# Patient Record
Sex: FEMALE | Race: BLACK OR AFRICAN AMERICAN | NOT HISPANIC OR LATINO
[De-identification: names, ages, dates, MRNs, and addresses within clinical notes are randomized per-mention and may not be internally consistent; named-entity substitution may affect disease eponyms.]

---

## 2017-05-03 ENCOUNTER — APPOINTMENT (OUTPATIENT)
Dept: CT IMAGING | Facility: CLINIC | Age: 47
End: 2017-05-03

## 2017-05-03 ENCOUNTER — OUTPATIENT (OUTPATIENT)
Dept: OUTPATIENT SERVICES | Facility: HOSPITAL | Age: 47
LOS: 1 days | End: 2017-05-03
Payer: MEDICARE

## 2017-05-03 DIAGNOSIS — Z00.8 ENCOUNTER FOR OTHER GENERAL EXAMINATION: ICD-10-CM

## 2017-05-03 PROCEDURE — 74177 CT ABD & PELVIS W/CONTRAST: CPT

## 2017-08-30 ENCOUNTER — APPOINTMENT (OUTPATIENT)
Dept: CT IMAGING | Facility: CLINIC | Age: 47
End: 2017-08-30

## 2017-09-10 ENCOUNTER — APPOINTMENT (OUTPATIENT)
Dept: MRI IMAGING | Facility: CLINIC | Age: 47
End: 2017-09-10

## 2017-10-11 ENCOUNTER — OUTPATIENT (OUTPATIENT)
Dept: OUTPATIENT SERVICES | Facility: HOSPITAL | Age: 47
LOS: 1 days | Discharge: HOME | End: 2017-10-11
Payer: OTHER MISCELLANEOUS

## 2017-10-11 VITALS
RESPIRATION RATE: 18 BRPM | HEART RATE: 73 BPM | HEIGHT: 71 IN | WEIGHT: 190.92 LBS | DIASTOLIC BLOOD PRESSURE: 104 MMHG | SYSTOLIC BLOOD PRESSURE: 140 MMHG | OXYGEN SATURATION: 100 % | TEMPERATURE: 98 F

## 2017-10-11 DIAGNOSIS — Z01.818 ENCOUNTER FOR OTHER PREPROCEDURAL EXAMINATION: ICD-10-CM

## 2017-10-11 DIAGNOSIS — M54.12 RADICULOPATHY, CERVICAL REGION: ICD-10-CM

## 2017-10-11 DIAGNOSIS — Z98.891 HISTORY OF UTERINE SCAR FROM PREVIOUS SURGERY: Chronic | ICD-10-CM

## 2017-10-11 DIAGNOSIS — Z90.49 ACQUIRED ABSENCE OF OTHER SPECIFIED PARTS OF DIGESTIVE TRACT: Chronic | ICD-10-CM

## 2017-10-11 LAB
ANION GAP SERPL CALC-SCNC: 8 MMOL/L — SIGNIFICANT CHANGE UP (ref 5–17)
APPEARANCE UR: CLEAR — SIGNIFICANT CHANGE UP
APTT BLD: 27.4 SEC — LOW (ref 27.5–37.4)
BASOPHILS # BLD AUTO: 0.1 K/UL — SIGNIFICANT CHANGE UP (ref 0–0.2)
BILIRUB UR-MCNC: NEGATIVE — SIGNIFICANT CHANGE UP
BUN SERPL-MCNC: 10 MG/DL — SIGNIFICANT CHANGE UP (ref 7–23)
CALCIUM SERPL-MCNC: 8.4 MG/DL — LOW (ref 8.5–10.1)
CHLORIDE SERPL-SCNC: 107 MMOL/L — SIGNIFICANT CHANGE UP (ref 96–108)
CO2 SERPL-SCNC: 24 MMOL/L — SIGNIFICANT CHANGE UP (ref 22–31)
COLOR SPEC: YELLOW — SIGNIFICANT CHANGE UP
CREAT SERPL-MCNC: 0.93 MG/DL — SIGNIFICANT CHANGE UP (ref 0.5–1.3)
DIFF PNL FLD: NEGATIVE — SIGNIFICANT CHANGE UP
EOSINOPHIL # BLD AUTO: 0.2 K/UL — SIGNIFICANT CHANGE UP (ref 0–0.5)
EOSINOPHIL NFR BLD AUTO: 1 % — SIGNIFICANT CHANGE UP (ref 0–6)
GLUCOSE SERPL-MCNC: 90 MG/DL — SIGNIFICANT CHANGE UP (ref 70–99)
GLUCOSE UR QL: NEGATIVE MG/DL — SIGNIFICANT CHANGE UP
HBA1C BLD-MCNC: 5.8 % — HIGH (ref 4–5.6)
HCT VFR BLD CALC: 32.2 % — LOW (ref 34.5–45)
HGB BLD-MCNC: 10 G/DL — LOW (ref 11.5–15.5)
INR BLD: 0.93 RATIO — SIGNIFICANT CHANGE UP (ref 0.88–1.16)
KETONES UR-MCNC: NEGATIVE — SIGNIFICANT CHANGE UP
LEUKOCYTE ESTERASE UR-ACNC: NEGATIVE — SIGNIFICANT CHANGE UP
LYMPHOCYTES # BLD AUTO: 1.8 K/UL — SIGNIFICANT CHANGE UP (ref 1–3.3)
LYMPHOCYTES # BLD AUTO: 12 % — LOW (ref 13–44)
MCHC RBC-ENTMCNC: 26 PG — LOW (ref 27–34)
MCHC RBC-ENTMCNC: 31.1 GM/DL — LOW (ref 32–36)
MCV RBC AUTO: 83.5 FL — SIGNIFICANT CHANGE UP (ref 80–100)
MONOCYTES # BLD AUTO: 0.5 K/UL — SIGNIFICANT CHANGE UP (ref 0–0.9)
MONOCYTES NFR BLD AUTO: 6 % — SIGNIFICANT CHANGE UP (ref 2–14)
NEUTROPHILS # BLD AUTO: 3 K/UL — SIGNIFICANT CHANGE UP (ref 1.8–7.4)
NEUTROPHILS NFR BLD AUTO: 81 % — HIGH (ref 43–77)
NITRITE UR-MCNC: POSITIVE
PH UR: 5 — SIGNIFICANT CHANGE UP (ref 5–8)
PLATELET # BLD AUTO: 309 K/UL — SIGNIFICANT CHANGE UP (ref 150–400)
POTASSIUM SERPL-MCNC: 4 MMOL/L — SIGNIFICANT CHANGE UP (ref 3.5–5.3)
POTASSIUM SERPL-SCNC: 4 MMOL/L — SIGNIFICANT CHANGE UP (ref 3.5–5.3)
PROT UR-MCNC: NEGATIVE MG/DL — SIGNIFICANT CHANGE UP
PROTHROM AB SERPL-ACNC: 10.1 SEC — SIGNIFICANT CHANGE UP (ref 9.8–12.7)
RBC # BLD: 3.85 M/UL — SIGNIFICANT CHANGE UP (ref 3.8–5.2)
RBC # FLD: 15.7 % — HIGH (ref 11–15)
SODIUM SERPL-SCNC: 139 MMOL/L — SIGNIFICANT CHANGE UP (ref 135–145)
SP GR SPEC: 1.01 — SIGNIFICANT CHANGE UP (ref 1.01–1.02)
UROBILINOGEN FLD QL: NEGATIVE MG/DL — SIGNIFICANT CHANGE UP
WBC # BLD: 5.6 K/UL — SIGNIFICANT CHANGE UP (ref 3.8–10.5)
WBC # FLD AUTO: 5.6 K/UL — SIGNIFICANT CHANGE UP (ref 3.8–10.5)

## 2017-10-11 PROCEDURE — 93010 ELECTROCARDIOGRAM REPORT: CPT | Mod: NC

## 2017-10-11 NOTE — H&P PST ADULT - HISTORY OF PRESENT ILLNESS
48 yo female c/o neck pains due to work injury - not responding to conservative management- radiculopathy- scheduled for cervical diskectomy

## 2017-10-11 NOTE — H&P PST ADULT - PSH
C2 cervical fracture    S/P  section  With Tubal Ligation ( 17 years ago )  S/P laparoscopic cholecystectomy  2017

## 2017-10-11 NOTE — H&P PST ADULT - NSANTHOSAYNRD_GEN_A_CORE
No. BRI screening performed.  STOP BANG Legend: 0-2 = LOW Risk; 3-4 = INTERMEDIATE Risk; 5-8 = HIGH Risk

## 2017-10-12 DIAGNOSIS — M54.2 CERVICALGIA: ICD-10-CM

## 2017-10-12 DIAGNOSIS — I10 ESSENTIAL (PRIMARY) HYPERTENSION: ICD-10-CM

## 2017-10-23 ENCOUNTER — INPATIENT (INPATIENT)
Facility: HOSPITAL | Age: 47
LOS: 1 days | Discharge: HOME | End: 2017-10-25
Attending: ORTHOPAEDIC SURGERY | Admitting: ORTHOPAEDIC SURGERY
Payer: OTHER MISCELLANEOUS

## 2017-10-23 ENCOUNTER — RESULT REVIEW (OUTPATIENT)
Age: 47
End: 2017-10-23

## 2017-10-23 VITALS
HEIGHT: 71 IN | WEIGHT: 192.02 LBS | SYSTOLIC BLOOD PRESSURE: 131 MMHG | RESPIRATION RATE: 18 BRPM | TEMPERATURE: 98 F | DIASTOLIC BLOOD PRESSURE: 95 MMHG | OXYGEN SATURATION: 99 % | HEART RATE: 68 BPM

## 2017-10-23 DIAGNOSIS — I10 ESSENTIAL (PRIMARY) HYPERTENSION: ICD-10-CM

## 2017-10-23 DIAGNOSIS — Z29.9 ENCOUNTER FOR PROPHYLACTIC MEASURES, UNSPECIFIED: ICD-10-CM

## 2017-10-23 DIAGNOSIS — Z90.49 ACQUIRED ABSENCE OF OTHER SPECIFIED PARTS OF DIGESTIVE TRACT: Chronic | ICD-10-CM

## 2017-10-23 DIAGNOSIS — Z98.891 HISTORY OF UTERINE SCAR FROM PREVIOUS SURGERY: Chronic | ICD-10-CM

## 2017-10-23 DIAGNOSIS — R51 HEADACHE: ICD-10-CM

## 2017-10-23 PROCEDURE — 88304 TISSUE EXAM BY PATHOLOGIST: CPT | Mod: 26

## 2017-10-23 PROCEDURE — 88311 DECALCIFY TISSUE: CPT | Mod: 26

## 2017-10-23 RX ORDER — PREGABALIN 225 MG/1
1 CAPSULE ORAL
Qty: 0 | Refills: 0 | COMMUNITY

## 2017-10-23 RX ORDER — FENTANYL CITRATE 50 UG/ML
50 INJECTION INTRAVENOUS
Qty: 0 | Refills: 0 | Status: DISCONTINUED | OUTPATIENT
Start: 2017-10-23 | End: 2017-10-23

## 2017-10-23 RX ORDER — KETOROLAC TROMETHAMINE 30 MG/ML
30 SYRINGE (ML) INJECTION ONCE
Qty: 0 | Refills: 0 | Status: DISCONTINUED | OUTPATIENT
Start: 2017-10-23 | End: 2017-10-23

## 2017-10-23 RX ORDER — FAMOTIDINE 10 MG/ML
20 INJECTION INTRAVENOUS EVERY 12 HOURS
Qty: 0 | Refills: 0 | Status: DISCONTINUED | OUTPATIENT
Start: 2017-10-23 | End: 2017-10-25

## 2017-10-23 RX ORDER — FENTANYL CITRATE 50 UG/ML
25 INJECTION INTRAVENOUS
Qty: 0 | Refills: 0 | Status: DISCONTINUED | OUTPATIENT
Start: 2017-10-23 | End: 2017-10-23

## 2017-10-23 RX ORDER — CYCLOBENZAPRINE HYDROCHLORIDE 10 MG/1
10 TABLET, FILM COATED ORAL THREE TIMES A DAY
Qty: 0 | Refills: 0 | Status: DISCONTINUED | OUTPATIENT
Start: 2017-10-23 | End: 2017-10-25

## 2017-10-23 RX ORDER — ZOLPIDEM TARTRATE 10 MG/1
5 TABLET ORAL ONCE
Qty: 0 | Refills: 0 | Status: DISCONTINUED | OUTPATIENT
Start: 2017-10-23 | End: 2017-10-23

## 2017-10-23 RX ORDER — MORPHINE SULFATE 50 MG/1
2 CAPSULE, EXTENDED RELEASE ORAL
Qty: 0 | Refills: 0 | Status: DISCONTINUED | OUTPATIENT
Start: 2017-10-23 | End: 2017-10-23

## 2017-10-23 RX ORDER — SODIUM CHLORIDE 9 MG/ML
1000 INJECTION, SOLUTION INTRAVENOUS
Qty: 0 | Refills: 0 | Status: DISCONTINUED | OUTPATIENT
Start: 2017-10-23 | End: 2017-10-23

## 2017-10-23 RX ORDER — NORTRIPTYLINE HYDROCHLORIDE 10 MG/1
25 CAPSULE ORAL AT BEDTIME
Qty: 0 | Refills: 0 | Status: DISCONTINUED | OUTPATIENT
Start: 2017-10-23 | End: 2017-10-25

## 2017-10-23 RX ORDER — DIPHENHYDRAMINE HCL 50 MG
12.5 CAPSULE ORAL EVERY 4 HOURS
Qty: 0 | Refills: 0 | Status: DISCONTINUED | OUTPATIENT
Start: 2017-10-23 | End: 2017-10-25

## 2017-10-23 RX ORDER — ACETAMINOPHEN 500 MG
650 TABLET ORAL EVERY 6 HOURS
Qty: 0 | Refills: 0 | Status: DISCONTINUED | OUTPATIENT
Start: 2017-10-23 | End: 2017-10-25

## 2017-10-23 RX ORDER — ATENOLOL 25 MG/1
25 TABLET ORAL DAILY
Qty: 0 | Refills: 0 | Status: DISCONTINUED | OUTPATIENT
Start: 2017-10-23 | End: 2017-10-25

## 2017-10-23 RX ORDER — ACETAMINOPHEN 500 MG
1000 TABLET ORAL ONCE
Qty: 0 | Refills: 0 | Status: COMPLETED | OUTPATIENT
Start: 2017-10-23 | End: 2017-10-23

## 2017-10-23 RX ORDER — CARISOPRODOL 250 MG
1 TABLET ORAL
Qty: 0 | Refills: 0 | COMMUNITY

## 2017-10-23 RX ORDER — OXYCODONE AND ACETAMINOPHEN 5; 325 MG/1; MG/1
1 TABLET ORAL EVERY 4 HOURS
Qty: 0 | Refills: 0 | Status: DISCONTINUED | OUTPATIENT
Start: 2017-10-23 | End: 2017-10-23

## 2017-10-23 RX ORDER — SODIUM CHLORIDE 9 MG/ML
3 INJECTION INTRAMUSCULAR; INTRAVENOUS; SUBCUTANEOUS EVERY 8 HOURS
Qty: 0 | Refills: 0 | Status: DISCONTINUED | OUTPATIENT
Start: 2017-10-23 | End: 2017-10-23

## 2017-10-23 RX ORDER — ATENOLOL 25 MG/1
1 TABLET ORAL
Qty: 0 | Refills: 0 | COMMUNITY

## 2017-10-23 RX ORDER — ZOLPIDEM TARTRATE 10 MG/1
1 TABLET ORAL
Qty: 0 | Refills: 0 | COMMUNITY

## 2017-10-23 RX ORDER — CEFAZOLIN SODIUM 1 G
2000 VIAL (EA) INJECTION EVERY 8 HOURS
Qty: 0 | Refills: 0 | Status: COMPLETED | OUTPATIENT
Start: 2017-10-23 | End: 2017-10-24

## 2017-10-23 RX ORDER — OXYCODONE AND ACETAMINOPHEN 5; 325 MG/1; MG/1
2 TABLET ORAL EVERY 6 HOURS
Qty: 0 | Refills: 0 | Status: DISCONTINUED | OUTPATIENT
Start: 2017-10-23 | End: 2017-10-25

## 2017-10-23 RX ORDER — SODIUM CHLORIDE 9 MG/ML
1000 INJECTION INTRAMUSCULAR; INTRAVENOUS; SUBCUTANEOUS
Qty: 0 | Refills: 0 | Status: DISCONTINUED | OUTPATIENT
Start: 2017-10-23 | End: 2017-10-24

## 2017-10-23 RX ORDER — NORTRIPTYLINE HYDROCHLORIDE 10 MG/1
0 CAPSULE ORAL
Qty: 0 | Refills: 0 | COMMUNITY

## 2017-10-23 RX ORDER — ONDANSETRON 8 MG/1
4 TABLET, FILM COATED ORAL EVERY 6 HOURS
Qty: 0 | Refills: 0 | Status: DISCONTINUED | OUTPATIENT
Start: 2017-10-23 | End: 2017-10-25

## 2017-10-23 RX ADMIN — Medication 400 MILLIGRAM(S): at 10:22

## 2017-10-23 RX ADMIN — FENTANYL CITRATE 50 MICROGRAM(S): 50 INJECTION INTRAVENOUS at 10:45

## 2017-10-23 RX ADMIN — OXYCODONE AND ACETAMINOPHEN 2 TABLET(S): 5; 325 TABLET ORAL at 20:54

## 2017-10-23 RX ADMIN — Medication 100 MILLIGRAM(S): at 18:04

## 2017-10-23 RX ADMIN — ZOLPIDEM TARTRATE 5 MILLIGRAM(S): 10 TABLET ORAL at 21:22

## 2017-10-23 RX ADMIN — MORPHINE SULFATE 2 MILLIGRAM(S): 50 CAPSULE, EXTENDED RELEASE ORAL at 16:33

## 2017-10-23 RX ADMIN — OXYCODONE AND ACETAMINOPHEN 2 TABLET(S): 5; 325 TABLET ORAL at 14:50

## 2017-10-23 RX ADMIN — Medication 100 MILLIGRAM(S): at 15:38

## 2017-10-23 RX ADMIN — NORTRIPTYLINE HYDROCHLORIDE 25 MILLIGRAM(S): 10 CAPSULE ORAL at 21:13

## 2017-10-23 RX ADMIN — SODIUM CHLORIDE 75 MILLILITER(S): 9 INJECTION, SOLUTION INTRAVENOUS at 10:22

## 2017-10-23 RX ADMIN — OXYCODONE AND ACETAMINOPHEN 2 TABLET(S): 5; 325 TABLET ORAL at 19:54

## 2017-10-23 RX ADMIN — MORPHINE SULFATE 2 MILLIGRAM(S): 50 CAPSULE, EXTENDED RELEASE ORAL at 12:18

## 2017-10-23 RX ADMIN — MORPHINE SULFATE 2 MILLIGRAM(S): 50 CAPSULE, EXTENDED RELEASE ORAL at 16:50

## 2017-10-23 RX ADMIN — FENTANYL CITRATE 50 MICROGRAM(S): 50 INJECTION INTRAVENOUS at 10:22

## 2017-10-23 RX ADMIN — SODIUM CHLORIDE 75 MILLILITER(S): 9 INJECTION INTRAMUSCULAR; INTRAVENOUS; SUBCUTANEOUS at 21:13

## 2017-10-23 RX ADMIN — OXYCODONE AND ACETAMINOPHEN 2 TABLET(S): 5; 325 TABLET ORAL at 13:52

## 2017-10-23 RX ADMIN — MORPHINE SULFATE 2 MILLIGRAM(S): 50 CAPSULE, EXTENDED RELEASE ORAL at 12:40

## 2017-10-23 RX ADMIN — Medication 1000 MILLIGRAM(S): at 10:45

## 2017-10-23 NOTE — CONSULT NOTE ADULT - ASSESSMENT
47 y.o PMH HTN  s/p ACDF POD #0 c/o of right frontal HA and mild blurry vision after surgery. Blurry vision has now resolved and ot denies any dizziness while at rest. Pt has no focal deficits  and is talking and eating well and appears to be in no distress. Sxs possibly d/t side effects of general  anesthesia vs pain medication vs less likely CSF leak l. Would give trial of Tylenol and encourage hydration and reassess. If pain increases and is  refractory to pain control or becomes neurologically symptomatic she should get CT of the head.

## 2017-10-23 NOTE — BRIEF OPERATIVE NOTE - PROCEDURE
<<-----Click on this checkbox to enter Procedure Cervical disc arthroplasty  10/23/2017  C 6/7  Active  EVERT

## 2017-10-23 NOTE — CHART NOTE - NSCHARTNOTEFT_GEN_A_CORE
POD#0 s/p ACDF  Called for patient c/o headache.  Patient seen and examined bedside resting comfortably.  Denies cp, dyspnea, dizziness, palpitations.  Admits to mild odynophagia "because of breathing tube"  Was given percocet about 1.5 hours ago, and reports headache began about 15 minutes ago.  Ate a small lunch, denies n/v.    T(F): 97.7 (10-23-17 @ 15:35), Max: 97.7 (10-23-17 @ 06:42)  HR: 81 (10-23-17 @ 15:35) (68 - 82)  BP: 119/87 (10-23-17 @ 15:35) (107/75 - 131/95)  RR: 17 (10-23-17 @ 15:35) (12 - 18)  SpO2: 97% (10-23-17 @ 15:35) (94% - 100%)      PHYSICAL EXAM:  General: NAD, WDWN  Neuro:  Alert & oriented x 3  HEENT: NCAT, EOMI, conjunctiva clear  Neck: supple, no swelling. Dressing CDI.  CV: +S1+S2 regular rate and rhythm  Lung: clear to ausculation bilaterally, respirations nonlabored, good inspiratory effort  Abdomen: soft, NTND. Normoactive BS  Extremities: no pedal edema or calf tenderness noted   MS: normal upper extremity strength and FROM b/l upper ext. Good pulses      Impression: 47y Female admitted with EXTENSIVE SURGERY/TOTAL CERVICAL DISC REPLACEMENT C6-C7 WITH  TOTAL CERVICAL DISC REPLACEMENT C6-C7 WITH IMAGE now c/o headache    PMH   HTN (hypertension)  Back injuries  Depressed      Plan:  -pt does not want any medication for headache at this time  -notified Dr Tao's PA, Godfrey. Tylenol prn.  -close monitoring, Continue current regimen and medications  -add maintenance IVF for now

## 2017-10-23 NOTE — CONSULT NOTE ADULT - SUBJECTIVE AND OBJECTIVE BOX
47 y.o PMH HTN  s/p ACDF POD 0 c/o of right frontal HA and mild blurry vision after surgery. Pt reports the headache started at after the procedure and described the pain as throbbig and 7/10. Also c/o of the room spinning when trying to get up to go to the bathroom. Denies any syncope  n/v fever or chills or facial weakness or dysarthria. Denies any hx of HA in the past. Pt reports the dizziness and blurry vision have resolved but the HA remains. Pt has been feeding and eliminating  well since surgery.     PMH: HTN, depression   PSH: cholecystectomy, ACDF, c/s  allergies: none  meds: soma  prn, percocet prn, atenolol    VS reviewed and stable   GEN: NAD, no distress speaking in full sentences   HEENT: PERRLA, EOMI.   neuro: no focal deficits. CNI.  strength and sensation intact   resp: CTABL  CV: rrr, no m/r/g  abd: soft

## 2017-10-24 LAB
ANION GAP SERPL CALC-SCNC: 9 MMOL/L — SIGNIFICANT CHANGE UP (ref 5–17)
BUN SERPL-MCNC: 5 MG/DL — LOW (ref 7–23)
CALCIUM SERPL-MCNC: 7.8 MG/DL — LOW (ref 8.5–10.1)
CHLORIDE SERPL-SCNC: 107 MMOL/L — SIGNIFICANT CHANGE UP (ref 96–108)
CO2 SERPL-SCNC: 25 MMOL/L — SIGNIFICANT CHANGE UP (ref 22–31)
CREAT SERPL-MCNC: 0.86 MG/DL — SIGNIFICANT CHANGE UP (ref 0.5–1.3)
GLUCOSE SERPL-MCNC: 97 MG/DL — SIGNIFICANT CHANGE UP (ref 70–99)
HCT VFR BLD CALC: 29.2 % — LOW (ref 34.5–45)
HGB BLD-MCNC: 9.2 G/DL — LOW (ref 11.5–15.5)
MCHC RBC-ENTMCNC: 26.6 PG — LOW (ref 27–34)
MCHC RBC-ENTMCNC: 31.4 GM/DL — LOW (ref 32–36)
MCV RBC AUTO: 84.6 FL — SIGNIFICANT CHANGE UP (ref 80–100)
PLATELET # BLD AUTO: 265 K/UL — SIGNIFICANT CHANGE UP (ref 150–400)
POTASSIUM SERPL-MCNC: 3.4 MMOL/L — LOW (ref 3.5–5.3)
POTASSIUM SERPL-SCNC: 3.4 MMOL/L — LOW (ref 3.5–5.3)
RBC # BLD: 3.45 M/UL — LOW (ref 3.8–5.2)
RBC # FLD: 16 % — HIGH (ref 11–15)
SODIUM SERPL-SCNC: 141 MMOL/L — SIGNIFICANT CHANGE UP (ref 135–145)
WBC # BLD: 8.8 K/UL — SIGNIFICANT CHANGE UP (ref 3.8–10.5)
WBC # FLD AUTO: 8.8 K/UL — SIGNIFICANT CHANGE UP (ref 3.8–10.5)

## 2017-10-24 RX ORDER — METOCLOPRAMIDE HCL 10 MG
10 TABLET ORAL ONCE
Qty: 0 | Refills: 0 | Status: DISCONTINUED | OUTPATIENT
Start: 2017-10-24 | End: 2017-10-24

## 2017-10-24 RX ORDER — ZOLPIDEM TARTRATE 10 MG/1
5 TABLET ORAL AT BEDTIME
Qty: 0 | Refills: 0 | Status: DISCONTINUED | OUTPATIENT
Start: 2017-10-24 | End: 2017-10-25

## 2017-10-24 RX ADMIN — OXYCODONE AND ACETAMINOPHEN 2 TABLET(S): 5; 325 TABLET ORAL at 09:00

## 2017-10-24 RX ADMIN — ZOLPIDEM TARTRATE 5 MILLIGRAM(S): 10 TABLET ORAL at 22:00

## 2017-10-24 RX ADMIN — OXYCODONE AND ACETAMINOPHEN 2 TABLET(S): 5; 325 TABLET ORAL at 21:13

## 2017-10-24 RX ADMIN — Medication 100 MILLIGRAM(S): at 00:31

## 2017-10-24 RX ADMIN — NORTRIPTYLINE HYDROCHLORIDE 25 MILLIGRAM(S): 10 CAPSULE ORAL at 22:00

## 2017-10-24 RX ADMIN — Medication 100 MILLIGRAM(S): at 05:33

## 2017-10-24 RX ADMIN — ATENOLOL 25 MILLIGRAM(S): 25 TABLET ORAL at 05:33

## 2017-10-24 RX ADMIN — OXYCODONE AND ACETAMINOPHEN 2 TABLET(S): 5; 325 TABLET ORAL at 15:00

## 2017-10-24 RX ADMIN — OXYCODONE AND ACETAMINOPHEN 2 TABLET(S): 5; 325 TABLET ORAL at 20:13

## 2017-10-24 RX ADMIN — OXYCODONE AND ACETAMINOPHEN 2 TABLET(S): 5; 325 TABLET ORAL at 07:58

## 2017-10-24 RX ADMIN — Medication 100 MILLIGRAM(S): at 18:07

## 2017-10-24 RX ADMIN — OXYCODONE AND ACETAMINOPHEN 2 TABLET(S): 5; 325 TABLET ORAL at 14:07

## 2017-10-24 NOTE — PHYSICAL THERAPY INITIAL EVALUATION ADULT - GAIT DEVIATIONS NOTED, PT EVAL
decreased weight-shifting ability/increased stride width/increased time in double stance/decreased stride length

## 2017-10-24 NOTE — PHYSICAL THERAPY INITIAL EVALUATION ADULT - MODIFIED CLINICAL TEST OF SENSORY INTEGRATION IN BALANCE TEST
Barthel Index: Feeding Score _10__, Bathing Score _0__, Grooming Score _5__, Dressing Score _5__, Bowels Score _10__, Bladder Score _10__, Toilet Score _10__, Transfers Score __10_, Mobility Score __0_, Stairs Score __5_,     Total Score __65_

## 2017-10-24 NOTE — PROGRESS NOTE ADULT - ASSESSMENT
47 y.o PMH HTN  s/p ACDF POD #1 c/o of right frontal HA and mild blurry vision after surgery. Pt reports the HA has improved but is still present. dizziness aslo improved Pt still has no focal deficits C/w tylenol  and start o2 NC. If sxs dont resolve would recommend neuro eval.

## 2017-10-24 NOTE — PHYSICAL THERAPY INITIAL EVALUATION ADULT - ADDITIONAL COMMENTS
Per pt she lives with her 3 children (18yo, 21yo, & 24yo) in a two story home private, 4 entry steps (no rail), flight to 2nd level (+ rail). Prior to admission pt was independent with all functional mobility including community ambulation without a device. Pt is right hand dominant. Pt is out of work on disability. Pt's children are either in school or working.    Pt states she will stay on the first level

## 2017-10-24 NOTE — PROGRESS NOTE ADULT - SUBJECTIVE AND OBJECTIVE BOX
Patient is a 47y old  Female who presents with a chief complaint of     INTERVAL HPI/OVERNIGHT EVENTS: 47 y.o PMH HTN  s/p ACDF POD 1. Pt reports the HA is improving, but still present. denies any diplopia or photophobia.  Pt is ambulating  with  walker and denies any dizziness.    denies fever, chills, n/v.     MEDICATIONS  (STANDING):  ATENolol  Tablet 25 milliGRAM(s) Oral daily  nortriptyline 25 milliGRAM(s) Oral at bedtime  pregabalin 100 milliGRAM(s) Oral two times a day  sodium chloride 0.9%. 1000 milliLiter(s) (75 mL/Hr) IV Continuous <Continuous>    MEDICATIONS  (PRN):  acetaminophen   Tablet 650 milliGRAM(s) Oral every 6 hours PRN For Temp greater than 38 C (100.4 F)  acetaminophen   Tablet. 650 milliGRAM(s) Oral every 6 hours PRN Mild Pain (1 - 3)  aluminum hydroxide/magnesium hydroxide/simethicone Suspension 30 milliLiter(s) Oral every 12 hours PRN Indigestion  artificial  tears Solution 1 Drop(s) Both EYES every 2 hours PRN Dry Eyes  cyclobenzaprine 10 milliGRAM(s) Oral three times a day PRN Muscle Spasm  diphenhydrAMINE   Injectable 12.5 milliGRAM(s) IV Push every 4 hours PRN Itching  famotidine    Tablet 20 milliGRAM(s) Oral every 12 hours PRN Dyspepsia  ondansetron Injectable 4 milliGRAM(s) IV Push every 6 hours PRN Nausea and/or Vomiting  oxyCODONE    5 mG/acetaminophen 325 mG 2 Tablet(s) Oral every 6 hours PRN Moderate Pain (4 - 6)      Allergies    No Known Allergies    Intolerances      Vital Signs Last 24 Hrs  T(C): 37.1 (24 Oct 2017 17:38), Max: 37.1 (24 Oct 2017 17:38)  T(F): 98.7 (24 Oct 2017 17:38), Max: 98.7 (24 Oct 2017 17:38)  HR: 75 (24 Oct 2017 17:38) (72 - 87)  BP: 139/98 (24 Oct 2017 17:38) (123/81 - 144/75)  BP(mean): --  RR: 16 (24 Oct 2017 17:38) (16 - 17)  SpO2: 99% (24 Oct 2017 17:38) (97% - 100%)    PHYSICAL EXAM:  GENERAL: NAD, well-groomed, well-developed  HEAD:  Atraumatic, Normocephalic  EYES: EOMI, PERRLA, conjunctiva and sclera clear  ENMT: No tonsillar erythema, exudates, or enlargement; Moist mucous membranes,   NECK: Supple, No JVD, Normal thyroid  NERVOUS SYSTEM:  Alert & Oriented X3, Good concentration; Motor Strength 5/5 B/L upper and lower extremities; DTRs 2+ intact and symmetric  CHEST/LUNG: Clear to percussion bilaterally; No rales, rhonchi, wheezing, or rubs  HEART: Regular rate and rhythm; No murmurs, rubs, or gallops  ABDOMEN: Soft, Nontender, Nondistended; Bowel sounds present  EXTREMITIES:  2+ Peripheral Pulses, No clubbing, cyanosis, or edema  LYMPH: No lymphadenopathy noted  SKIN: No rashes or lesions    LABS:                        9.2    8.8   )-----------( 265      ( 24 Oct 2017 06:26 )             29.2     10-24    141  |  107  |  5<L>  ----------------------------<  97  3.4<L>   |  25  |  0.86    Ca    7.8<L>      24 Oct 2017 06:26          CAPILLARY BLOOD GLUCOSE          RADIOLOGY & ADDITIONAL TESTS:    Imaging Personally Reviewed:  [ ] YES  [ ] NO    Consultant(s) Notes Reviewed:  [ ] YES  [ ] NO    Care Discussed with Consultants/Other Providers [ ] YES  [ ] NO

## 2017-10-24 NOTE — CHART NOTE - NSCHARTNOTEFT_GEN_A_CORE
Pt was seen earlier by Dr. Keys.  He heard wheezing. Duoneb treatment given.  Upon examination now, chest is clear to ausculation after the treatment.  Discussed with Dr. Keys. He said pt is stable for discharge from his point of view.  I had attempted to contact Dr. Tao & his VANDANA Jensen. Neither peson answered their cell phones. A voicemail was left on VANDANA Villalobos's phone. Dr. Tao's phone did not accept voicemail.    Pt states she feels very tired now because of lack of sleep last night. However, she is anxious to go home.

## 2017-10-24 NOTE — PROGRESS NOTE ADULT - SUBJECTIVE AND OBJECTIVE BOX
Postoperative Day #: 1 s/p ACDF C6-7    Patient seen and examined bedside resting comfortably.  C/O continued right frontal headache. States the pain is less than yesterday. States that light bothers her. Says her vision is no longer blurry.  C/O sore throat. No difficulty swallowing. Has tolerated diet.  Abdominal pain is well controlled.  Denies nausea and vomiting. Tolerating diet.  Flatus/BM. +  Denies chest pain, dyspnea, cough.  T(F): 98 (10-24-17 @ 05:31), Max: 98.5 (10-23-17 @ 19:50)  HR: 85 (10-24-17 @ 05:31) (69 - 87)  BP: 138/94 (10-24-17 @ 05:31) (107/75 - 143/89)  RR: 17 (10-24-17 @ 05:31) (12 - 18)  SpO2: 98% (10-24-17 @ 05:31) (94% - 100%)    PHYSICAL EXAM:  General: NAD, WDWN  Neuro:  Alert & oriented x 3  decreased strength in left hand/arm. Pt says it is about the same as preop.  Abdomen: soft, NTND. Normactive BS    LABS:                        9.2    8.8   )-----------( 265      ( 24 Oct 2017 06:26 )             29.2     10-24    141  |  107  |  5<L>  ----------------------------<  97  3.4<L>   |  25  |  0.86    Ca    7.8<L>      24 Oct 2017 06:26        I&O's Detail    23 Oct 2017 07:01  -  24 Oct 2017 07:00  --------------------------------------------------------  IN:    lactated ringers.: 110 mL    sodium chloride 0.9%.: 900 mL    Solution: 100 mL  Total IN: 1110 mL    OUT:  Total OUT: 0 mL    Total NET: 1110 mL    did not do well in physical therapy yesterday  medical consult noted    Impression: 47y Female admitted with EXTENSIVE SURGERY/TOTAL CERVICAL DISC REPLACEMENT C6-C7 WITH  TOTAL CERVICAL DISC REPLACEMENT C6-C7 WITH IMAGE  headache  hypokalemia    Plan:  -continue VTE prophylaxis   - continue medical f/u  -Increase activity with PT, OOB, Ambulate  -Patient instructed on and encouraged incentive spirometry use  -continue local wound care  -will discuss with surgical attending Postoperative Day #: 1 s/p ACDF C6-7    Patient seen and examined bedside resting comfortably.  C/O continued right frontal headache. States the pain is less than yesterday. States that light bothers her. Says her vision is no longer blurry.  C/O sore throat. No difficulty swallowing. Has tolerated diet.  Abdominal pain is well controlled.  Denies nausea and vomiting. Tolerating diet.  Flatus/BM. +  Denies chest pain, dyspnea, cough.  T(F): 98 (10-24-17 @ 05:31), Max: 98.5 (10-23-17 @ 19:50)  HR: 85 (10-24-17 @ 05:31) (69 - 87)  BP: 138/94 (10-24-17 @ 05:31) (107/75 - 143/89)  RR: 17 (10-24-17 @ 05:31) (12 - 18)  SpO2: 98% (10-24-17 @ 05:31) (94% - 100%)    PHYSICAL EXAM:  General: NAD, WDWN  Neuro:  Alert & oriented x 3  decreased strength in left hand/arm. Pt says it is about the same as preop.  Neck dressing is clean & dry. Incisional tenderness .   Abdomen: soft, NTND. Normactive BS    LABS:                        9.2    8.8   )-----------( 265      ( 24 Oct 2017 06:26 )             29.2     10-24    141  |  107  |  5<L>  ----------------------------<  97  3.4<L>   |  25  |  0.86    Ca    7.8<L>      24 Oct 2017 06:26        I&O's Detail    23 Oct 2017 07:01  -  24 Oct 2017 07:00  --------------------------------------------------------  IN:    lactated ringers.: 110 mL    sodium chloride 0.9%.: 900 mL    Solution: 100 mL  Total IN: 1110 mL    OUT:  Total OUT: 0 mL    Total NET: 1110 mL    did not do well in physical therapy yesterday  medical consult noted    Impression: 47y Female admitted with EXTENSIVE SURGERY/TOTAL CERVICAL DISC REPLACEMENT C6-C7 WITH  TOTAL CERVICAL DISC REPLACEMENT C6-C7 WITH IMAGE  headache  hypokalemia    Plan:  -continue VTE prophylaxis   - continue medical f/u  -Increase activity with PT, OOB, Ambulate  - will not replace potassium at this time because of major drug reactions. Should correct with diet.  -Patient instructed on and encouraged incentive spirometry use  -continue local wound care  -will discuss with surgical attending

## 2017-10-25 ENCOUNTER — TRANSCRIPTION ENCOUNTER (OUTPATIENT)
Age: 47
End: 2017-10-25

## 2017-10-25 VITALS
DIASTOLIC BLOOD PRESSURE: 95 MMHG | RESPIRATION RATE: 17 BRPM | SYSTOLIC BLOOD PRESSURE: 127 MMHG | HEART RATE: 75 BPM | OXYGEN SATURATION: 99 %

## 2017-10-25 LAB
ALBUMIN SERPL ELPH-MCNC: 2.6 G/DL — LOW (ref 3.3–5)
ALP SERPL-CCNC: 57 U/L — SIGNIFICANT CHANGE UP (ref 40–120)
ALT FLD-CCNC: 7 U/L — LOW (ref 12–78)
ANION GAP SERPL CALC-SCNC: 8 MMOL/L — SIGNIFICANT CHANGE UP (ref 5–17)
AST SERPL-CCNC: 13 U/L — LOW (ref 15–37)
BILIRUB SERPL-MCNC: 0.2 MG/DL — SIGNIFICANT CHANGE UP (ref 0.2–1.2)
BUN SERPL-MCNC: 6 MG/DL — LOW (ref 7–23)
CALCIUM SERPL-MCNC: 8.1 MG/DL — LOW (ref 8.5–10.1)
CHLORIDE SERPL-SCNC: 104 MMOL/L — SIGNIFICANT CHANGE UP (ref 96–108)
CO2 SERPL-SCNC: 29 MMOL/L — SIGNIFICANT CHANGE UP (ref 22–31)
CREAT SERPL-MCNC: 0.68 MG/DL — SIGNIFICANT CHANGE UP (ref 0.5–1.3)
GLUCOSE SERPL-MCNC: 92 MG/DL — SIGNIFICANT CHANGE UP (ref 70–99)
HCT VFR BLD CALC: 28.7 % — LOW (ref 34.5–45)
HGB BLD-MCNC: 8.9 G/DL — LOW (ref 11.5–15.5)
MCHC RBC-ENTMCNC: 25.8 PG — LOW (ref 27–34)
MCHC RBC-ENTMCNC: 31.1 GM/DL — LOW (ref 32–36)
MCV RBC AUTO: 83.2 FL — SIGNIFICANT CHANGE UP (ref 80–100)
PLATELET # BLD AUTO: 281 K/UL — SIGNIFICANT CHANGE UP (ref 150–400)
POTASSIUM SERPL-MCNC: 3.3 MMOL/L — LOW (ref 3.5–5.3)
POTASSIUM SERPL-SCNC: 3.3 MMOL/L — LOW (ref 3.5–5.3)
PROT SERPL-MCNC: 6.5 GM/DL — SIGNIFICANT CHANGE UP (ref 6–8.3)
RBC # BLD: 3.45 M/UL — LOW (ref 3.8–5.2)
RBC # FLD: 16.4 % — HIGH (ref 11–15)
SODIUM SERPL-SCNC: 141 MMOL/L — SIGNIFICANT CHANGE UP (ref 135–145)
WBC # BLD: 6.3 K/UL — SIGNIFICANT CHANGE UP (ref 3.8–10.5)
WBC # FLD AUTO: 6.3 K/UL — SIGNIFICANT CHANGE UP (ref 3.8–10.5)

## 2017-10-25 RX ORDER — OXYCODONE HYDROCHLORIDE 5 MG/1
1 TABLET ORAL
Qty: 0 | Refills: 0 | COMMUNITY

## 2017-10-25 RX ORDER — CYCLOBENZAPRINE HYDROCHLORIDE 10 MG/1
1 TABLET, FILM COATED ORAL
Qty: 0 | Refills: 0 | COMMUNITY
Start: 2017-10-25

## 2017-10-25 RX ADMIN — OXYCODONE AND ACETAMINOPHEN 2 TABLET(S): 5; 325 TABLET ORAL at 06:08

## 2017-10-25 RX ADMIN — Medication 100 MILLIGRAM(S): at 06:05

## 2017-10-25 RX ADMIN — ATENOLOL 25 MILLIGRAM(S): 25 TABLET ORAL at 06:05

## 2017-10-25 RX ADMIN — OXYCODONE AND ACETAMINOPHEN 2 TABLET(S): 5; 325 TABLET ORAL at 07:05

## 2017-10-25 NOTE — DISCHARGE NOTE ADULT - CARE PROVIDER_API CALL
Wayne Tao), Orthopaedic Surgery  [address] [place] [address]  Phone: [phone]  Fax: [phone]

## 2017-10-25 NOTE — DISCHARGE NOTE ADULT - PLAN OF CARE
relief of preoperative symptoms No lifting  anything greater than 10 pounds. No strenuous activity. Rest as needed. Drink plenty of fluids to prevent constipation.  F/u in 's office. Keep your appointment. continue medical treatment Tylenol as needed.  see your PMD if head ache returns.

## 2017-10-25 NOTE — DISCHARGE NOTE ADULT - HOSPITAL COURSE
Pt was admitted 10/23/17 s/p elective cervical athroplasty c6-7.  Postoperatively, the patient c/o a severe headache of her right frontal area. A medical consult was done. She was placed on O2 to assist with pain relief since fear of bleeding from NSAIDs limited pain relief options.  The pt also c/o feeling very light headed & refused to have physical therapy evaluation.  On POD # 1, the dizziness resolved. She was evaluated by physical therapy. They suggested a cane for stabilization while walking, ambulating.  On POD # 2, She was cleared for discharge. VANDANA Villalobos stated that Rx was sent from the office.  To f/u in office. She has made an appointment already.

## 2017-10-25 NOTE — DISCHARGE NOTE ADULT - PATIENT PORTAL LINK FT
“You can access the Follow[employer] Patient Portal, offered by [facility], by registering with the following website: [email or web address]”

## 2017-10-25 NOTE — PROGRESS NOTE ADULT - SUBJECTIVE AND OBJECTIVE BOX
Postoperative Day #: 2 s/p arthroplasty at c6-7    Patient seen and examined bedside resting comfortably.  No complaints offered.  No c/o headaches  Denies nausea and vomiting. Tolerating diet.  Flatus/BM. +  Denies chest pain, dyspnea, cough.  T(F): 97.2 (10-25-17 @ 06:02), Max: 98.7 (10-24-17 @ 17:38)  HR: 72 (10-25-17 @ 08:30) (64 - 80)  BP: 114/80 (10-25-17 @ 08:30) (114/80 - 159/105)  RR: 16 (10-25-17 @ 08:30) (15 - 17)  SpO2: 100% (10-25-17 @ 08:30) (97% - 100%)  Wt(kg): --  CAPILLARY BLOOD GLUCOSE        PHYSICAL EXAM:  General: NAD, WDWN  Neuro:  Alert & oriented x 3. No focal defects noted.  neck: incisional tenderness. Dressing dry & intact.  Abdomen: soft, NTND. Normactive BS    LABS:                        8.9    6.3   )-----------( 281      ( 25 Oct 2017 06:28 )             28.7     10-25    141  |  104  |  6<L>  ----------------------------<  92  3.3<L>   |  29  |  0.68    Ca    8.1<L>      25 Oct 2017 06:28    TPro  6.5  /  Alb  2.6<L>  /  TBili  0.2  /  DBili  x   /  AST  13<L>  /  ALT  7<L>  /  AlkPhos  57  10-25    Have discussed pt with Dr Keys. He is OK with pt. being discharged today.  Have also discussed with VANDANA Villalobos. He agrees with discharge also    Impression: 47y Female Postoperative Day #: 2 s/p arthroplasty at c6-7  hypokalemia  headache - resolved      Plan:  discharge to home today.  pt advised to eat bananas & orange juice to replace potassium since major interaction occurs with present meds & K.  f/u in office.

## 2017-10-25 NOTE — DISCHARGE NOTE ADULT - ADDITIONAL INSTRUCTIONS
May start to shower on 10/25. Shower with dressing in place. Then remove the dressing. You can reapply a small dressing or  band Aid over wound after that.  Use cane as instructed by Physical Therapy. May start to shower on 10/28. Shower with dressing in place. Then remove the dressing. You can reapply a small dressing or  band Aid over wound after that.  Use cane as instructed by Physical Therapy.

## 2017-10-25 NOTE — DISCHARGE NOTE ADULT - NS AS ACTIVITY OBS
No Heavy lifting/straining/Do not drive or operate machinery/Showering allowed/Do not make important decisions/May start to shower on 10/28.

## 2017-10-25 NOTE — DISCHARGE NOTE ADULT - MEDICATION SUMMARY - MEDICATIONS TO TAKE
I will START or STAY ON the medications listed below when I get home from the hospital:    Lyrica 100 mg oral capsule  -- 1 cap(s) by mouth 2 times a day  -- Indication: For Pain    nortriptyline 25 mg oral capsule  -- orally once a day (at bedtime)  -- Indication: For antidepressant    Ambien 5 mg oral tablet  -- 1 tab(s) by mouth once a day (at bedtime)  -- Indication: For sleep aid    atenolol 25 mg oral tablet  -- 1 tab(s) by mouth once a day  -- Indication: For High blood pressure    cyclobenzaprine 10 mg oral tablet  -- 1 tab(s) by mouth 3 times a day, As needed, Muscle Spasm  -- Indication: For muscle relaxant    Soma  -- 1 tab(s) by mouth 2 times a day, As Needed  -- Indication: For muscle relaxant    Vitamin B-12  -- 1 tab(s) by mouth once a day  -- Indication: For vitamin

## 2017-10-25 NOTE — DISCHARGE NOTE ADULT - CARE PLAN
Principal Discharge DX:	Cervical radiculopathy at C6  Goal:	relief of preoperative symptoms  Instructions for follow-up, activity and diet:	No lifting  anything greater than 10 pounds. No strenuous activity. Rest as needed. Drink plenty of fluids to prevent constipation.  F/u in 's office. Keep your appointment.  Secondary Diagnosis:	Essential hypertension  Instructions for follow-up, activity and diet:	continue medical treatment  Secondary Diagnosis:	Headache above the eye region  Instructions for follow-up, activity and diet:	Tylenol as needed.  see your PMD if head ache returns.

## 2017-10-30 DIAGNOSIS — H53.8 OTHER VISUAL DISTURBANCES: ICD-10-CM

## 2017-10-30 DIAGNOSIS — F32.9 MAJOR DEPRESSIVE DISORDER, SINGLE EPISODE, UNSPECIFIED: ICD-10-CM

## 2017-10-30 DIAGNOSIS — R51 HEADACHE: ICD-10-CM

## 2017-10-30 DIAGNOSIS — E87.6 HYPOKALEMIA: ICD-10-CM

## 2017-10-30 DIAGNOSIS — M50.123 CERVICAL DISC DISORDER AT C6-C7 LEVEL WITH RADICULOPATHY: ICD-10-CM

## 2017-10-30 DIAGNOSIS — I10 ESSENTIAL (PRIMARY) HYPERTENSION: ICD-10-CM

## 2017-10-30 DIAGNOSIS — Z90.49 ACQUIRED ABSENCE OF OTHER SPECIFIED PARTS OF DIGESTIVE TRACT: ICD-10-CM

## 2017-12-07 NOTE — ASU PATIENT PROFILE, ADULT - NSALCOHOLAMT_GEN_A_CORE_SD
57M CAD s/p CABG, HTN, hx CVA with L weakness. Admitted with new right medullary stroke.  Course complicated by urinary retention requiring haro which has been removed, aspiration pneumonia s/p zosyn and now sinusitis.  Today is D#2 augmentin of 10 days.      Please call Infectious Diseases if there is a change in status.  Thank you.  [phone].  - d/c planning to rehab 1-2 drinks

## 2018-07-24 PROBLEM — I10 ESSENTIAL (PRIMARY) HYPERTENSION: Chronic | Status: ACTIVE | Noted: 2017-10-12

## 2018-08-02 ENCOUNTER — OUTPATIENT (OUTPATIENT)
Dept: OUTPATIENT SERVICES | Facility: HOSPITAL | Age: 48
LOS: 1 days | End: 2018-08-02
Payer: COMMERCIAL

## 2018-08-02 ENCOUNTER — OUTPATIENT (OUTPATIENT)
Dept: OUTPATIENT SERVICES | Facility: HOSPITAL | Age: 48
LOS: 1 days | End: 2018-08-02
Payer: MEDICARE

## 2018-08-02 ENCOUNTER — APPOINTMENT (OUTPATIENT)
Dept: CT IMAGING | Facility: CLINIC | Age: 48
End: 2018-08-02
Payer: MEDICARE

## 2018-08-02 ENCOUNTER — APPOINTMENT (OUTPATIENT)
Dept: MRI IMAGING | Facility: CLINIC | Age: 48
End: 2018-08-02
Payer: OTHER MISCELLANEOUS

## 2018-08-02 DIAGNOSIS — Z00.8 ENCOUNTER FOR OTHER GENERAL EXAMINATION: ICD-10-CM

## 2018-08-02 DIAGNOSIS — Z98.891 HISTORY OF UTERINE SCAR FROM PREVIOUS SURGERY: Chronic | ICD-10-CM

## 2018-08-02 DIAGNOSIS — Z90.49 ACQUIRED ABSENCE OF OTHER SPECIFIED PARTS OF DIGESTIVE TRACT: Chronic | ICD-10-CM

## 2018-08-02 PROCEDURE — 72141 MRI NECK SPINE W/O DYE: CPT

## 2018-08-02 PROCEDURE — 72141 MRI NECK SPINE W/O DYE: CPT | Mod: 26

## 2018-08-02 PROCEDURE — 74177 CT ABD & PELVIS W/CONTRAST: CPT

## 2018-08-02 PROCEDURE — 82565 ASSAY OF CREATININE: CPT

## 2018-08-02 PROCEDURE — 74177 CT ABD & PELVIS W/CONTRAST: CPT | Mod: 26

## 2018-12-04 ENCOUNTER — RESULT REVIEW (OUTPATIENT)
Age: 48
End: 2018-12-04

## 2020-08-17 ENCOUNTER — EMERGENCY (EMERGENCY)
Facility: HOSPITAL | Age: 50
LOS: 0 days | Discharge: HOME | End: 2020-08-17
Payer: COMMERCIAL

## 2020-08-17 VITALS
HEIGHT: 71 IN | SYSTOLIC BLOOD PRESSURE: 141 MMHG | DIASTOLIC BLOOD PRESSURE: 102 MMHG | WEIGHT: 169.98 LBS | OXYGEN SATURATION: 100 % | HEART RATE: 87 BPM | TEMPERATURE: 98 F | RESPIRATION RATE: 16 BRPM

## 2020-08-17 VITALS — DIASTOLIC BLOOD PRESSURE: 99 MMHG | SYSTOLIC BLOOD PRESSURE: 133 MMHG

## 2020-08-17 DIAGNOSIS — Z98.891 HISTORY OF UTERINE SCAR FROM PREVIOUS SURGERY: Chronic | ICD-10-CM

## 2020-08-17 DIAGNOSIS — Z90.49 ACQUIRED ABSENCE OF OTHER SPECIFIED PARTS OF DIGESTIVE TRACT: Chronic | ICD-10-CM

## 2020-08-17 PROCEDURE — 99284 EMERGENCY DEPT VISIT MOD MDM: CPT

## 2020-08-17 RX ORDER — KETOROLAC TROMETHAMINE 30 MG/ML
60 SYRINGE (ML) INJECTION ONCE
Refills: 0 | Status: DISCONTINUED | OUTPATIENT
Start: 2020-08-17 | End: 2020-08-17

## 2020-08-17 RX ORDER — CYCLOBENZAPRINE HYDROCHLORIDE 10 MG/1
1 TABLET, FILM COATED ORAL
Qty: 20 | Refills: 0
Start: 2020-08-17 | End: 2020-08-26

## 2020-08-17 RX ORDER — IBUPROFEN 200 MG
1 TABLET ORAL
Qty: 15 | Refills: 0
Start: 2020-08-17 | End: 2020-08-21

## 2020-08-17 RX ADMIN — Medication 60 MILLIGRAM(S): at 11:48
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
## 2020-08-17 NOTE — ED PROVIDER NOTE - CLINICAL SUMMARY MEDICAL DECISION MAKING FREE TEXT BOX
rest, ice, pain meds Due to lack of trauma a fracture is unlikely for this patient. No imaging will be ordered at this time. Pt is instructed to do conservative management and f/u w a specialist in 2 days, re-evaluation and possible imaging at this point for persistent pain  Discussed results and outcome of testing with the patient.  Patient advised to please follow up with their primary care doctor within the next 24-48 hours and return to the Emergency Department for worsening symptoms or any other concerns.  Patient advised that their doctor may call  to follow up on the specific results of the tests performed today in the emergency department.

## 2020-08-17 NOTE — ED PROVIDER NOTE - MUSCULOSKELETAL, MLM
Spine appears normal, range of motion is not limited, no muscle or joint tenderness shoulder LEFT-AROM , GOOD PUSLE AND SENSORY, NO ERYTHEMA, NO STS; LOW BACK- +aROM GOOD PUSLE AND SENSORY ,
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
## 2020-08-17 NOTE — ED PROVIDER NOTE - PATIENT PORTAL LINK FT
You can access the FollowMyHealth Patient Portal offered by [facility] by registering at the following website: [email or web address]. By joining [employer]’s FollowMyHealth portal, you will also be able to view your health information using other applications (apps) compatible with our system.
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
## 2020-08-20 DIAGNOSIS — F32.9 MAJOR DEPRESSIVE DISORDER, SINGLE EPISODE, UNSPECIFIED: ICD-10-CM

## 2020-08-20 DIAGNOSIS — M25.511 PAIN IN RIGHT SHOULDER: ICD-10-CM

## 2020-08-20 DIAGNOSIS — Z79.1 LONG TERM (CURRENT) USE OF NON-STEROIDAL ANTI-INFLAMMATORIES (NSAID): ICD-10-CM
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
## 2020-08-20 DIAGNOSIS — I10 ESSENTIAL (PRIMARY) HYPERTENSION: ICD-10-CM

## 2020-08-20 DIAGNOSIS — M25.512 PAIN IN LEFT SHOULDER: ICD-10-CM

## 2020-08-20 DIAGNOSIS — M54.5 LOW BACK PAIN: ICD-10-CM

## 2020-08-20 DIAGNOSIS — M54.2 CERVICALGIA: ICD-10-CM

## 2020-08-20 DIAGNOSIS — S16.1XXA STRAIN OF MUSCLE, FASCIA AND TENDON AT NECK LEVEL, INITIAL ENCOUNTER: ICD-10-CM

## 2021-01-12 ENCOUNTER — RESULT REVIEW (OUTPATIENT)
Age: 51
End: 2021-01-12

## 2022-07-29 ENCOUNTER — EMERGENCY (EMERGENCY)
Facility: HOSPITAL | Age: 52
LOS: 1 days | Discharge: HOME | End: 2022-07-29
Attending: EMERGENCY MEDICINE | Admitting: EMERGENCY MEDICINE
Payer: SELF-PAY

## 2022-07-29 VITALS
SYSTOLIC BLOOD PRESSURE: 140 MMHG | TEMPERATURE: 98 F | RESPIRATION RATE: 15 BRPM | DIASTOLIC BLOOD PRESSURE: 91 MMHG | HEART RATE: 83 BPM | OXYGEN SATURATION: 100 %

## 2022-07-29 VITALS
HEART RATE: 90 BPM | TEMPERATURE: 98 F | OXYGEN SATURATION: 97 % | SYSTOLIC BLOOD PRESSURE: 128 MMHG | WEIGHT: 166.89 LBS | DIASTOLIC BLOOD PRESSURE: 101 MMHG | HEIGHT: 71 IN | RESPIRATION RATE: 16 BRPM

## 2022-07-29 DIAGNOSIS — Z90.49 ACQUIRED ABSENCE OF OTHER SPECIFIED PARTS OF DIGESTIVE TRACT: Chronic | ICD-10-CM

## 2022-07-29 DIAGNOSIS — Z98.891 HISTORY OF UTERINE SCAR FROM PREVIOUS SURGERY: Chronic | ICD-10-CM

## 2022-07-29 LAB
ALBUMIN SERPL ELPH-MCNC: 3.8 G/DL — SIGNIFICANT CHANGE UP (ref 3.3–5)
ALP SERPL-CCNC: 152 U/L — HIGH (ref 40–120)
ALT FLD-CCNC: 39 U/L — SIGNIFICANT CHANGE UP (ref 12–78)
ANION GAP SERPL CALC-SCNC: 10 MMOL/L — SIGNIFICANT CHANGE UP (ref 5–17)
APPEARANCE UR: ABNORMAL
AST SERPL-CCNC: 34 U/L — SIGNIFICANT CHANGE UP (ref 15–37)
BASOPHILS # BLD AUTO: 0.05 K/UL — SIGNIFICANT CHANGE UP (ref 0–0.2)
BASOPHILS NFR BLD AUTO: 0.7 % — SIGNIFICANT CHANGE UP (ref 0–2)
BILIRUB SERPL-MCNC: 0.3 MG/DL — SIGNIFICANT CHANGE UP (ref 0.2–1.2)
BILIRUB UR-MCNC: NEGATIVE — SIGNIFICANT CHANGE UP
BUN SERPL-MCNC: 18 MG/DL — SIGNIFICANT CHANGE UP (ref 7–23)
CALCIUM SERPL-MCNC: 10.2 MG/DL — HIGH (ref 8.5–10.1)
CHLORIDE SERPL-SCNC: 99 MMOL/L — SIGNIFICANT CHANGE UP (ref 96–108)
CO2 SERPL-SCNC: 27 MMOL/L — SIGNIFICANT CHANGE UP (ref 22–31)
COLOR SPEC: YELLOW — SIGNIFICANT CHANGE UP
CREAT SERPL-MCNC: 0.95 MG/DL — SIGNIFICANT CHANGE UP (ref 0.5–1.3)
DIFF PNL FLD: ABNORMAL
EGFR: 72 ML/MIN/1.73M2 — SIGNIFICANT CHANGE UP
EOSINOPHIL # BLD AUTO: 0.47 K/UL — SIGNIFICANT CHANGE UP (ref 0–0.5)
EOSINOPHIL NFR BLD AUTO: 6.9 % — HIGH (ref 0–6)
GLUCOSE SERPL-MCNC: 118 MG/DL — HIGH (ref 70–99)
GLUCOSE UR QL: NEGATIVE — SIGNIFICANT CHANGE UP
HCT VFR BLD CALC: 40.5 % — SIGNIFICANT CHANGE UP (ref 34.5–45)
HGB BLD-MCNC: 13.3 G/DL — SIGNIFICANT CHANGE UP (ref 11.5–15.5)
IMM GRANULOCYTES NFR BLD AUTO: 0.1 % — SIGNIFICANT CHANGE UP (ref 0–1.5)
KETONES UR-MCNC: ABNORMAL
LACTATE SERPL-SCNC: 1.8 MMOL/L — SIGNIFICANT CHANGE UP (ref 0.7–2)
LEUKOCYTE ESTERASE UR-ACNC: ABNORMAL
LYMPHOCYTES # BLD AUTO: 1.27 K/UL — SIGNIFICANT CHANGE UP (ref 1–3.3)
LYMPHOCYTES # BLD AUTO: 18.7 % — SIGNIFICANT CHANGE UP (ref 13–44)
MCHC RBC-ENTMCNC: 32 PG — SIGNIFICANT CHANGE UP (ref 27–34)
MCHC RBC-ENTMCNC: 32.8 GM/DL — SIGNIFICANT CHANGE UP (ref 32–36)
MCV RBC AUTO: 97.6 FL — SIGNIFICANT CHANGE UP (ref 80–100)
MONOCYTES # BLD AUTO: 0.41 K/UL — SIGNIFICANT CHANGE UP (ref 0–0.9)
MONOCYTES NFR BLD AUTO: 6 % — SIGNIFICANT CHANGE UP (ref 2–14)
NEUTROPHILS # BLD AUTO: 4.57 K/UL — SIGNIFICANT CHANGE UP (ref 1.8–7.4)
NEUTROPHILS NFR BLD AUTO: 67.6 % — SIGNIFICANT CHANGE UP (ref 43–77)
NITRITE UR-MCNC: POSITIVE
NRBC # BLD: 0 /100 WBCS — SIGNIFICANT CHANGE UP (ref 0–0)
PH UR: 5 — SIGNIFICANT CHANGE UP (ref 5–8)
PLATELET # BLD AUTO: 183 K/UL — SIGNIFICANT CHANGE UP (ref 150–400)
POTASSIUM SERPL-MCNC: 3.5 MMOL/L — SIGNIFICANT CHANGE UP (ref 3.5–5.3)
POTASSIUM SERPL-SCNC: 3.5 MMOL/L — SIGNIFICANT CHANGE UP (ref 3.5–5.3)
PROT SERPL-MCNC: 9.2 G/DL — HIGH (ref 6–8.3)
PROT UR-MCNC: 30 MG/DL
RBC # BLD: 4.15 M/UL — SIGNIFICANT CHANGE UP (ref 3.8–5.2)
RBC # FLD: 13.9 % — SIGNIFICANT CHANGE UP (ref 10.3–14.5)
SODIUM SERPL-SCNC: 136 MMOL/L — SIGNIFICANT CHANGE UP (ref 135–145)
SP GR SPEC: 1.02 — SIGNIFICANT CHANGE UP (ref 1.01–1.02)
UROBILINOGEN FLD QL: NEGATIVE — SIGNIFICANT CHANGE UP
WBC # BLD: 6.78 K/UL — SIGNIFICANT CHANGE UP (ref 3.8–10.5)
WBC # FLD AUTO: 6.78 K/UL — SIGNIFICANT CHANGE UP (ref 3.8–10.5)

## 2022-07-29 PROCEDURE — 99285 EMERGENCY DEPT VISIT HI MDM: CPT

## 2022-07-29 PROCEDURE — 81001 URINALYSIS AUTO W/SCOPE: CPT

## 2022-07-29 PROCEDURE — 87186 SC STD MICRODIL/AGAR DIL: CPT

## 2022-07-29 PROCEDURE — 80053 COMPREHEN METABOLIC PANEL: CPT

## 2022-07-29 PROCEDURE — 36415 COLL VENOUS BLD VENIPUNCTURE: CPT

## 2022-07-29 PROCEDURE — 96365 THER/PROPH/DIAG IV INF INIT: CPT | Mod: XU

## 2022-07-29 PROCEDURE — 87086 URINE CULTURE/COLONY COUNT: CPT

## 2022-07-29 PROCEDURE — 96375 TX/PRO/DX INJ NEW DRUG ADDON: CPT

## 2022-07-29 PROCEDURE — 83605 ASSAY OF LACTIC ACID: CPT

## 2022-07-29 PROCEDURE — 96361 HYDRATE IV INFUSION ADD-ON: CPT

## 2022-07-29 PROCEDURE — 99284 EMERGENCY DEPT VISIT MOD MDM: CPT | Mod: 25

## 2022-07-29 PROCEDURE — 74177 CT ABD & PELVIS W/CONTRAST: CPT | Mod: MA

## 2022-07-29 PROCEDURE — 85025 COMPLETE CBC W/AUTO DIFF WBC: CPT

## 2022-07-29 PROCEDURE — 74177 CT ABD & PELVIS W/CONTRAST: CPT | Mod: 26,MA

## 2022-07-29 RX ORDER — SODIUM CHLORIDE 9 MG/ML
1000 INJECTION INTRAMUSCULAR; INTRAVENOUS; SUBCUTANEOUS ONCE
Refills: 0 | Status: COMPLETED | OUTPATIENT
Start: 2022-07-29 | End: 2022-07-29

## 2022-07-29 RX ORDER — MORPHINE SULFATE 50 MG/1
4 CAPSULE, EXTENDED RELEASE ORAL ONCE
Refills: 0 | Status: DISCONTINUED | OUTPATIENT
Start: 2022-07-29 | End: 2022-07-29

## 2022-07-29 RX ORDER — KETOROLAC TROMETHAMINE 30 MG/ML
30 SYRINGE (ML) INJECTION ONCE
Refills: 0 | Status: DISCONTINUED | OUTPATIENT
Start: 2022-07-29 | End: 2022-07-29

## 2022-07-29 RX ORDER — ONDANSETRON 8 MG/1
4 TABLET, FILM COATED ORAL ONCE
Refills: 0 | Status: COMPLETED | OUTPATIENT
Start: 2022-07-29 | End: 2022-07-29

## 2022-07-29 RX ORDER — PANTOPRAZOLE SODIUM 20 MG/1
1 TABLET, DELAYED RELEASE ORAL
Qty: 30 | Refills: 0
Start: 2022-07-29 | End: 2022-08-27

## 2022-07-29 RX ORDER — SUCRALFATE 1 G
1 TABLET ORAL ONCE
Refills: 0 | Status: COMPLETED | OUTPATIENT
Start: 2022-07-29 | End: 2022-07-29

## 2022-07-29 RX ORDER — PANTOPRAZOLE SODIUM 20 MG/1
40 TABLET, DELAYED RELEASE ORAL ONCE
Refills: 0 | Status: COMPLETED | OUTPATIENT
Start: 2022-07-29 | End: 2022-07-29

## 2022-07-29 RX ORDER — CEFUROXIME AXETIL 250 MG
1 TABLET ORAL
Qty: 20 | Refills: 0
Start: 2022-07-29 | End: 2022-08-07

## 2022-07-29 RX ORDER — CEFTRIAXONE 500 MG/1
1000 INJECTION, POWDER, FOR SOLUTION INTRAMUSCULAR; INTRAVENOUS ONCE
Refills: 0 | Status: COMPLETED | OUTPATIENT
Start: 2022-07-29 | End: 2022-07-29

## 2022-07-29 RX ORDER — SUCRALFATE 1 G
1 TABLET ORAL
Qty: 56 | Refills: 0
Start: 2022-07-29 | End: 2022-08-11

## 2022-07-29 RX ADMIN — Medication 30 MILLILITER(S): at 20:07

## 2022-07-29 RX ADMIN — CEFTRIAXONE 1000 MILLIGRAM(S): 500 INJECTION, POWDER, FOR SOLUTION INTRAMUSCULAR; INTRAVENOUS at 18:20

## 2022-07-29 RX ADMIN — SODIUM CHLORIDE 1000 MILLILITER(S): 9 INJECTION INTRAMUSCULAR; INTRAVENOUS; SUBCUTANEOUS at 18:15

## 2022-07-29 RX ADMIN — CEFTRIAXONE 100 MILLIGRAM(S): 500 INJECTION, POWDER, FOR SOLUTION INTRAMUSCULAR; INTRAVENOUS at 17:48

## 2022-07-29 RX ADMIN — MORPHINE SULFATE 4 MILLIGRAM(S): 50 CAPSULE, EXTENDED RELEASE ORAL at 17:10

## 2022-07-29 RX ADMIN — MORPHINE SULFATE 4 MILLIGRAM(S): 50 CAPSULE, EXTENDED RELEASE ORAL at 18:56

## 2022-07-29 RX ADMIN — Medication 30 MILLIGRAM(S): at 20:07

## 2022-07-29 RX ADMIN — Medication 1 GRAM(S): at 20:07

## 2022-07-29 RX ADMIN — SODIUM CHLORIDE 1000 MILLILITER(S): 9 INJECTION INTRAMUSCULAR; INTRAVENOUS; SUBCUTANEOUS at 17:12

## 2022-07-29 RX ADMIN — PANTOPRAZOLE SODIUM 40 MILLIGRAM(S): 20 TABLET, DELAYED RELEASE ORAL at 20:08

## 2022-07-29 RX ADMIN — ONDANSETRON 4 MILLIGRAM(S): 8 TABLET, FILM COATED ORAL at 17:10

## 2022-07-29 NOTE — ED PROVIDER NOTE - PATIENT PORTAL LINK FT
You can access the FollowMyHealth Patient Portal offered by [facility] by registering at the following website: [email or web address]. By joining [employer]’s FollowMyHealth portal, you will also be able to view your health information using other applications (apps) compatible with our system.

## 2022-07-29 NOTE — ED ADULT TRIAGE NOTE - BP NONINVASIVE SYSTOLIC (MM HG)
History  Chief Complaint   Patient presents with    Breast Pain     Patient reports she has mastitis, currently on day 6 of antibiotics but pain has increased  HPI  This is a 74-year-old female who presents today with left breast pain  Patient states this is her 3rd time having mastitis  She is currently on antibiotics Augmentin day 6 of 7  Patient states her pain got severe so she wants to come get evaluated  Patient has had ultrasound done per for and found to have a fibroadenoma which was biopsy to confirm  She states that is approximately 5 cm and length  She states she is worried about an abscess  Denies any fevers or chills  7 days ago she did develop a fever along with body aches and felt have mastitis  Patient states she is currently not breast feeding she stopped about a week and a half ago  States her breast do not feel in court  Denies any discharge  No trauma to the chest   No cough congestion shortness of breath  Patient states she has been putting ice on her left breast   She has been compliant with antibiotics  74-year-old female presenting with left breast pain  Her ultrasound was used to assess for an abscess which was negative  Discussed with patient regarding prolonging antibiotics and advised to follow up with breath surgery if patient prefers to get an elective surgery since she has had multiple episodes of my cystitis  Advised to return if any fevers or chills  Prior to Admission Medications   Prescriptions Last Dose Informant Patient Reported? Taking?    Amoxicillin-Pot Clavulanate (AUGMENTIN PO) 5/27/2018 at 0900  Yes Yes   Sig: Take by mouth 2 (two) times a day   LINOLEIC ACID-SUNFLOWER OIL PO   Yes Yes   Sig: Take by mouth   Probiotic Product (PROBIOTIC PO) 5/27/2018 at Unknown time  Yes Yes   Sig: Take by mouth   acetaminophen (TYLENOL) 325 mg tablet 5/27/2018 at 1500  No Yes   Sig: Take 1 tablet (325 mg total) by mouth every 4 (four) hours as needed for mild pain ibuprofen (MOTRIN) 200 mg tablet 5/27/2018 at 1200 Self Yes Yes   Sig: Take 200 mg by mouth every 6 (six) hours as needed for mild pain      Facility-Administered Medications: None       Past Medical History:   Diagnosis Date    Anxiety     Depression     Irregular heart beat        Past Surgical History:   Procedure Laterality Date    ASD REPAIR      BREAST SURGERY Bilateral     implants and removal    CARDIAC ELECTROPHYSIOLOGY STUDY AND ABLATION      VT COLPOSCOPY,CERVIX W/ADJ VAG,W/LOOP BX N/A 6/8/2017    Procedure: BIOPSY LEEP CERVIX;  Surgeon: Kathern Severs, DO;  Location: [facility];  Service: Gynecology       History reviewed  No pertinent family history  I have reviewed and agree with the history as documented  Social History   Substance Use Topics    Smoking status: Former Smoker     Quit date: 5/29/2017    Smokeless tobacco: Never Used    Alcohol use Yes      Comment: occ        Review of Systems   Constitutional: Negative  Negative for diaphoresis and fever  HENT: Negative  Respiratory: Negative  Negative for cough, shortness of breath and wheezing  Cardiovascular: Negative  Negative for chest pain, palpitations and leg swelling  Gastrointestinal: Negative for abdominal distention, abdominal pain, nausea and vomiting  Genitourinary:        Left breast pain   Musculoskeletal: Negative  Skin: Negative  Neurological: Negative  Psychiatric/Behavioral: Negative  All other systems reviewed and are negative        Physical Exam  ED Triage Vitals   Temperature Pulse Respirations Blood Pressure SpO2   05/27/18 1648 05/27/18 1647 05/27/18 1647 05/27/18 1648 05/27/18 1648   97 6 °F (36 4 °C) 89 22 122/74 99 %      Temp Source Heart Rate Source Patient Position - Orthostatic VS BP Location FiO2 (%)   05/27/18 1647 05/27/18 1647 05/27/18 1648 05/27/18 1647 --   Oral Monitor Sitting Right arm       Pain Score       05/27/18 1648       9           Orthostatic Vital Signs  Vitals:    05/27/18 1647 05/27/18 1648   BP:  122/74   Pulse: 89 88   Patient Position - Orthostatic VS:  Sitting       Physical Exam   Constitutional: She is oriented to person, place, and time  She appears well-developed and well-nourished  No distress  HENT:   Head: Normocephalic and atraumatic  Nose: Nose normal    Mouth/Throat: Oropharynx is clear and moist    Eyes: Conjunctivae and EOM are normal  Pupils are equal, round, and reactive to light  Neck: Normal range of motion  Neck supple  Cardiovascular: Normal rate, regular rhythm and normal heart sounds  No murmur heard  Pulmonary/Chest: Effort normal and breath sounds normal    Abdominal: Soft  Bowel sounds are normal  She exhibits no distension  There is no tenderness  There is no rebound and no guarding  Genitourinary:   Genitourinary Comments: Left breast mild erythema with tenderness to palpation  No discharge from nipple  Musculoskeletal: Normal range of motion  She exhibits no edema, tenderness or deformity  Neurological: She is alert and oriented to person, place, and time  Skin: Skin is warm and dry  Capillary refill takes less than 2 seconds  She is not diaphoretic  Psychiatric: She has a normal mood and affect  Vitals reviewed        ED Medications  Medications - No data to display    Diagnostic Studies  Results Reviewed     None                 No orders to display         Procedures  Procedures      Phone Consults  ED Phone Contact    ED Course  ED Course as of May 29 0654   Sun May 27, 2018   1804 Bedside ultrasound was performed no abscess collection appreciated                                MDM  CritCare Time    Disposition  Final diagnoses:   Chronic mastitis of left breast     Time reflects when diagnosis was documented in both MDM as applicable and the Disposition within this note     Time User Action Codes Description Comment    5/27/2018  6:04 PM Dayan Johnson Add [N60 12] Chronic mastitis of left breast ED Disposition     ED Disposition Condition Comment    Discharge  Pollo Lantigua, discharge to home/self care  Condition at discharge: Good        Follow-up Information     Follow up With Specialties Details Why [address] [facility],[address], MD Breast Surgery Schedule an appointment as soon as possible for a visit  [facility]  [address]            Discharge Medication List as of 5/27/2018  6:06 PM      START taking these medications    Details   !! amoxicillin-clavulanate (AUGMENTIN) 875-125 mg per tablet Take 1 tablet by mouth every 12 (twelve) hours for 7 days, Starting Sun 5/27/2018, Until Sun 6/3/2018, Print      naproxen (NAPROSYN) 500 mg tablet Take 1 tablet (500 mg total) by mouth 2 (two) times a day with meals, Starting Sun 5/27/2018, Print       !! - Potential duplicate medications found  Please discuss with provider  CONTINUE these medications which have NOT CHANGED    Details   acetaminophen (TYLENOL) 325 mg tablet Take 1 tablet (325 mg total) by mouth every 4 (four) hours as needed for mild pain, Starting Sun 2/25/2018, No Print      !! Amoxicillin-Pot Clavulanate (AUGMENTIN PO) Take by mouth 2 (two) times a day, Historical Med      ibuprofen (MOTRIN) 200 mg tablet Take 200 mg by mouth every 6 (six) hours as needed for mild pain, Until Discontinued, Historical Med      LINOLEIC ACID-SUNFLOWER OIL PO Take by mouth, Historical Med      Probiotic Product (PROBIOTIC PO) Take by mouth, Until Discontinued, Historical Med       !! - Potential duplicate medications found  Please discuss with provider  No discharge procedures on file  ED Provider  Attending physically available and evaluated Pollo Lantigua,  I managed the patient along with the ED Attending      Electronically Signed by         Diana Shepard MD  05/29/18 [phone]

## 2022-07-29 NOTE — ED PROVIDER NOTE - OBJECTIVE STATEMENT
50 y/o F with hx of uterine fibroids with c/o right sided abd pain associated with dark urine, nausea, diarrhea and decreased PO intake.  pt denies fevers.

## 2022-07-29 NOTE — ED PROVIDER NOTE - CLINICAL SUMMARY MEDICAL DECISION MAKING FREE TEXT BOX
pt with right sided abd pain x few days associated with diarrhea and dark urine.  check labs, CT, UA r/o UTI/pyelo, kidney stones, appy, viral vs bacterial gastroenteritis, dehydration

## 2022-07-29 NOTE — ED PROVIDER NOTE - NSICDXPASTSURGICALHX_GEN_ALL_CORE_FT
PAST SURGICAL HISTORY:  C2 cervical fracture     S/P  section With Tubal Ligation ( 17 years ago )    S/P laparoscopic cholecystectomy 2017

## 2022-07-29 NOTE — ED PROVIDER NOTE - NSFOLLOWUPINSTRUCTIONS_ED_ALL_ED_FT
Peptic Ulcer    WHAT YOU NEED TO KNOW:    A peptic ulcer is an open sore in the lining of your stomach, intestine, or esophagus. Peptic ulcers have different names, depending on their location. Gastric ulcers are peptic ulcers in the stomach. Duodenal ulcers are peptic ulcers in the intestine. Esophageal ulcers are peptic ulcers in the esophagus. Peptic ulcers may be a short-term or long-term problem.  Digestive Tract         DISCHARGE INSTRUCTIONS:    Call your local emergency number (911 in the ) if:   •You have a fast heartbeat or fast breathing.      •You are too dizzy or weak to stand up.      •You vomit bright red blood.      •You have bright red blood in your bowel movement.      Seek care immediately if:   •You have severe pain in your stomach.      •Your vomit looks like coffee grounds.      •Your bowel movements are black.      •You have sudden shortness of breath.      Call your doctor if:   •You have a fever.      •You have diarrhea or constipation.      •Your stomach pain does not go away or gets worse after you take medicine.      •You have questions or concerns about your condition or care.      Medicines: You may need any of the following:  •Antacids decrease stomach acid.      •Antiulcer medicines help decrease the amount of acid made by the stomach. These help relieve pain and heal or prevent ulcers.      •Antibiotics help kill bacteria.  •Take your medicine as directed. Contact your healthcare provider if you think your medicine is not helping or if you have side effects. Tell him or her if you are allergic to any medicine. Keep a list of the medicines, vitamins, and herbs you take. Include the amounts, and when and why you take them. Bring the list or the pill bottles to follow-up visits. Carry your medicine list with you in case of an emergency.          Nutrition:   •Do not have carbonated drinks, alcohol, or caffeine. Caffeine is found in some coffees, teas, and sodas. It is also found in chocolate.      •Do not eat foods that upset your stomach. These include spicy or acidic foods, such as oranges.      •Eat small meals more often rather than big meals less often. An empty stomach may make your symptoms worse.      Do not smoke: Smoking increases your risk of developing ulcers. Nicotine and other chemicals in cigarettes and cigars can also cause lung damage. Ask your healthcare provider for information if you currently smoke and need help to quit. E-cigarettes or smokeless tobacco still contain nicotine. Talk to your healthcare provider before you use these products.    Follow up with your doctor as directed: Write down your questions so you remember to ask them during your visits.

## 2022-07-29 NOTE — ED PROVIDER NOTE - CARE PROVIDER_API CALL
Jassi Cash ()  Internal Medicine  [address] [place] [address]  Phone: [phone]  Fax: [phone]  Follow Up Time:

## 2022-08-01 LAB
-  AMIKACIN: SIGNIFICANT CHANGE UP
-  AMOXICILLIN/CLAVULANIC ACID: SIGNIFICANT CHANGE UP
-  AMPICILLIN/SULBACTAM: SIGNIFICANT CHANGE UP
-  AMPICILLIN: SIGNIFICANT CHANGE UP
-  AZTREONAM: SIGNIFICANT CHANGE UP
-  CEFAZOLIN: SIGNIFICANT CHANGE UP
-  CEFEPIME: SIGNIFICANT CHANGE UP
-  CEFTRIAXONE: SIGNIFICANT CHANGE UP
-  CIPROFLOXACIN: SIGNIFICANT CHANGE UP
-  ERTAPENEM: SIGNIFICANT CHANGE UP
-  GENTAMICIN: SIGNIFICANT CHANGE UP
-  IMIPENEM: SIGNIFICANT CHANGE UP
-  LEVOFLOXACIN: SIGNIFICANT CHANGE UP
-  MEROPENEM: SIGNIFICANT CHANGE UP
-  NITROFURANTOIN: SIGNIFICANT CHANGE UP
-  PIPERACILLIN/TAZOBACTAM: SIGNIFICANT CHANGE UP
-  TIGECYCLINE: SIGNIFICANT CHANGE UP
-  TOBRAMYCIN: SIGNIFICANT CHANGE UP
-  TRIMETHOPRIM/SULFAMETHOXAZOLE: SIGNIFICANT CHANGE UP
CULTURE RESULTS: SIGNIFICANT CHANGE UP
METHOD TYPE: SIGNIFICANT CHANGE UP
ORGANISM # SPEC MICROSCOPIC CNT: SIGNIFICANT CHANGE UP
ORGANISM # SPEC MICROSCOPIC CNT: SIGNIFICANT CHANGE UP
SPECIMEN SOURCE: SIGNIFICANT CHANGE UP

## 2022-09-19 NOTE — ED PROVIDER NOTE - CROS ED GI ALL NEG
- - - no wheezing/no cough/no hemoptysis/no pleuritic chest pain/no dyspnea Cheek-To-Nose Interpolation Flap Text: A decision was made to reconstruct the defect utilizing an interpolation axial flap and a staged reconstruction.  A telfa template was made of the defect.  This telfa template was then used to outline the Cheek-To-Nose Interpolation flap.  The donor area for the pedicle flap was then injected with anesthesia.  The flap was excised through the skin and subcutaneous tissue down to the layer of the underlying musculature.  The interpolation flap was carefully excised within this deep plane to maintain its blood supply.  The edges of the donor site were undermined.   The donor site was closed in a primary fashion.  The pedicle was then rotated into position and sutured.  Once the tube was sutured into place, adequate blood supply was confirmed with blanching and refill.  The pedicle was then wrapped with xeroform gauze and dressed appropriately with a telfa and gauze bandage to ensure continued blood supply and protect the attached pedicle.

## 2024-04-08 ENCOUNTER — NON-APPOINTMENT (OUTPATIENT)
Age: 54
End: 2024-04-08

## 2025-01-06 NOTE — H&P PST ADULT - PROBLEM SELECTOR PROBLEM 2
In an effort to ensure that our patients [employer], a Team Member has reviewed your chart and identified an opportunity to provide the best care possible. An attempt was made to discuss or schedule due or overdue Preventive or Chronic Condition care.Care Gaps identified: Hypertension.    The Outcome was Contact was not made, left message. We are attempting to schedule a follow up office visit. If you have any questions or need help with scheduling, contact your primary care provider..   Type of Appointment needed: Follow-up Visit     Hypertension

## 2025-02-26 NOTE — ASU PATIENT PROFILE, ADULT - BLOOD TRANSFUSION, PREVIOUS, PROFILE
PET scan on/after 03/04/25; Please call [phone] and ask for central scheduling to make an appointment for your procedure.   Continue to hold immunotherapy   DEXA scan after PET scan given recent femur fracture to evaluate need for bisphosphonate  Vitamin D and calcium supplement   CMP, every week until resolved LFT elevation   Follow up in 2 weeks    no

## 2025-03-14 ENCOUNTER — NON-APPOINTMENT (OUTPATIENT)
Age: 55
End: 2025-03-14
